# Patient Record
Sex: MALE | Race: WHITE | Employment: OTHER | URBAN - METROPOLITAN AREA
[De-identification: names, ages, dates, MRNs, and addresses within clinical notes are randomized per-mention and may not be internally consistent; named-entity substitution may affect disease eponyms.]

---

## 2024-05-25 ENCOUNTER — HOSPITAL ENCOUNTER (EMERGENCY)
Facility: HOSPITAL | Age: 69
Discharge: HOME | End: 2024-05-25
Attending: EMERGENCY MEDICINE

## 2024-05-25 VITALS
TEMPERATURE: 98 F | OXYGEN SATURATION: 98 % | RESPIRATION RATE: 16 BRPM | SYSTOLIC BLOOD PRESSURE: 118 MMHG | HEART RATE: 68 BPM | WEIGHT: 206.81 LBS | DIASTOLIC BLOOD PRESSURE: 78 MMHG

## 2024-05-25 DIAGNOSIS — S61.011A LACERATION OF RIGHT THUMB WITHOUT FOREIGN BODY WITHOUT DAMAGE TO NAIL, INITIAL ENCOUNTER: Primary | ICD-10-CM

## 2024-05-25 PROCEDURE — 99283 EMERGENCY DEPT VISIT LOW MDM: CPT

## 2024-05-25 PROCEDURE — 12001 RPR S/N/AX/GEN/TRNK 2.5CM/<: CPT

## 2024-05-25 NOTE — ED INITIAL ASSESSMENT (HPI)
Patient presenting with laceration on right 1st finger that happen while working on yard,per patient last tdap 2 years ago.

## 2024-05-25 NOTE — ED PROVIDER NOTES
Patient Seen in: [facility] Emergency Department      History     Chief Complaint   Patient presents with    Laceration     Stated Complaint: thumb laceration from electric mendoza.    Subjective:     HPI    68-year-old male who got his right thumb while using a hedge tremor.  Bleeding controlled with pressure.  He had his last tetanus immunization 2 years ago.  No other injury.    Objective:   History reviewed. No pertinent past medical history.           History reviewed. No pertinent surgical history.             Social History     Socioeconomic History    Marital status:    Tobacco Use    Smoking status: Never    Smokeless tobacco: Never   Vaping Use    Vaping status: Never Used   Substance and Sexual Activity    Alcohol use: Yes     Alcohol/week: 2.0 standard drinks of alcohol     Types: 2 Cans of beer per week    Drug use: Never     Social Determinants of Health     Financial Resource Strain: Low Risk  (5/3/2023)    Received from [facility]    Overall Financial Resource Strain (CARDIA)     Difficulty of Paying Living Expenses: Not hard at all   Food Insecurity: No Food Insecurity (5/3/2023)    Received from [facility]    Hunger Vital Sign     Worried About Running Out of Food in the Last Year: Never true     Ran Out of Food in the Last Year: Never true   Transportation Needs: No Transportation Needs (5/3/2023)    Received from [facility]    PRAPARE - Transportation     Lack of Transportation (Medical): No     Lack of Transportation (Non-Medical): No              Review of Systems    Positive for stated complaint: thumb laceration from electric mendoza.  Other systems are as noted in HPI.  Constitutional and vital signs reviewed.      All other systems reviewed and negative except as noted above.    Physical Exam     ED Triage Vitals [05/25/24 1446]   /78   Pulse 66   Resp 16   Temp 97.7 °F (36.5 °C)   Temp src Temporal   SpO2  97 %   O2 Device None (Room air)       Current:/78   Pulse 66   Temp 97.7 °F (36.5 °C) (Temporal)   Resp 16   Wt 93.8 kg   SpO2 97%     General:  Vitals as listed.  No acute distress   Laceration to the pad of the right thumb.  There is 2 parallel 1 cm lacerations over the pad.  Normal distal perfusion and sensation.    ED COURSE and MDM     Procedure:  The wound was anesthetized using digital block with 1% lidocaine. The wound was irrigated with normal saline. The wound was prepped and draped in the normal sterile fashion.  The wound was explored for foreign bodies and none were found. The edges were reapproximated using 4-0 nylon x 6. Bleeding was well-controlled. The patient tolerated the procedure well.    I have discussed with the patient the results of testing, differential diagnosis, and treatment plan. They expressed clear understanding of these instructions and agrees to the plan provided.    Disposition and Plan     Clinical Impression:  1. Laceration of right thumb without foreign body without damage to nail, initial encounter         Disposition:  There is no disposition on file for this visit.  There is no disposition time on file for this visit.    Follow-up:  [facility] Emergency Department  [address] [facility] [address]  [phone]  Follow up in 10 day(s)  ... or your usual primary care doctor, For suture removal        Medications Prescribed:  There are no discharge medications for this patient.